# Patient Record
Sex: FEMALE | Race: WHITE
[De-identification: names, ages, dates, MRNs, and addresses within clinical notes are randomized per-mention and may not be internally consistent; named-entity substitution may affect disease eponyms.]

---

## 2024-09-23 ENCOUNTER — APPOINTMENT (OUTPATIENT)
Dept: ORTHOPEDIC SURGERY | Facility: CLINIC | Age: 64
End: 2024-09-23
Payer: COMMERCIAL

## 2024-09-23 VITALS — WEIGHT: 106 LBS | HEIGHT: 60 IN | BODY MASS INDEX: 20.81 KG/M2 | RESPIRATION RATE: 16 BRPM

## 2024-09-23 DIAGNOSIS — Z87.39 PERSONAL HISTORY OF OTHER DISEASES OF THE MUSCULOSKELETAL SYSTEM AND CONNECTIVE TISSUE: ICD-10-CM

## 2024-09-23 DIAGNOSIS — Z78.9 OTHER SPECIFIED HEALTH STATUS: ICD-10-CM

## 2024-09-23 DIAGNOSIS — Z87.891 PERSONAL HISTORY OF NICOTINE DEPENDENCE: ICD-10-CM

## 2024-09-23 DIAGNOSIS — Z82.61 FAMILY HISTORY OF ARTHRITIS: ICD-10-CM

## 2024-09-23 DIAGNOSIS — M79.644 PAIN IN RIGHT FINGER(S): ICD-10-CM

## 2024-09-23 PROBLEM — Z00.00 ENCOUNTER FOR PREVENTIVE HEALTH EXAMINATION: Status: ACTIVE | Noted: 2024-09-23

## 2024-09-23 PROCEDURE — 76882 US LMTD JT/FCL EVL NVASC XTR: CPT

## 2024-09-23 PROCEDURE — 73130 X-RAY EXAM OF HAND: CPT | Mod: RT

## 2024-09-23 PROCEDURE — 20612 ASPIRATE/INJ GANGLION CYST: CPT

## 2024-09-23 PROCEDURE — 99205 OFFICE O/P NEW HI 60 MIN: CPT | Mod: 25

## 2024-09-23 RX ORDER — ESTRADIOL 10 UG/1
10 TABLET, FILM COATED VAGINAL
Refills: 0 | Status: ACTIVE | COMMUNITY

## 2024-09-23 RX ORDER — ROSUVASTATIN CALCIUM 5 MG/1
TABLET, FILM COATED ORAL
Refills: 0 | Status: ACTIVE | COMMUNITY

## 2024-09-23 RX ORDER — MOMETASONE FUROATE MONOHYDRATE 50 UG/1
SPRAY, METERED NASAL
Refills: 0 | Status: ACTIVE | COMMUNITY

## 2024-09-24 NOTE — HISTORY OF PRESENT ILLNESS
[Right] : right hand dominant [FreeTextEntry1] : Patient presents with possible right middle trigger finger.  She is experiencing right middle finger palmar A1 pulley pain.  Her symptoms started about a year ago.  Patient has not medically treated her symptom.  She complains of right, hand discomfort when holding a pen.

## 2024-09-24 NOTE — CONSULT LETTER
[Dear  ___] : Dear  [unfilled], [Consult Letter:] : I had the pleasure of evaluating your patient, [unfilled]. [Please see my note below.] : Please see my note below. [Consult Closing:] : Thank you very much for allowing me to participate in the care of this patient.  If you have any questions, please do not hesitate to contact me. [Sincerely,] : Sincerely, [FreeTextEntry3] : Benjy Blackman MD Co-Director [facility]

## 2024-09-24 NOTE — ASSESSMENT
[FreeTextEntry1] : My impression is that the patient has a right middle finger retinacular cyst. We discussed treatment options and I recommended an aspiration. The risks benefits and alternatives were discussed which included, but were not limited to infection, nerve injury, tendon injury, solid growth or tumor requiring subsequent surgery, recurrence, pain etc. Under informed consent and sterile conditions the mass was aspirated. It was no longer palpable post aspiration. A sterile Band-Aid was placed. Should it recur the patient will contact me.  She also has a trigger digit and the above injection will help treat this.  Should it recur she will call or return

## 2024-09-24 NOTE — PHYSICAL EXAM
[de-identified] : Cystic mass at level of right middle finger proximal digital crease with a positive lift maneuver and triggering. [de-identified] : PA lateral oblique x-rays not demonstrate any acute osseous abnormality.  Ultrasound demonstrates a large hypoechoic mass

## 2024-09-24 NOTE — PHYSICAL EXAM
[de-identified] : Cystic mass at level of right middle finger proximal digital crease with a positive lift maneuver and triggering. [de-identified] : PA lateral oblique x-rays not demonstrate any acute osseous abnormality.  Ultrasound demonstrates a large hypoechoic mass

## 2025-08-04 VITALS — BODY MASS INDEX: 20.81 KG/M2 | RESPIRATION RATE: 16 BRPM | WEIGHT: 106 LBS | HEIGHT: 60 IN

## 2025-08-11 ENCOUNTER — APPOINTMENT (OUTPATIENT)
Dept: ORTHOPEDIC SURGERY | Facility: CLINIC | Age: 65
End: 2025-08-11

## 2025-08-29 VITALS — HEIGHT: 60 IN | RESPIRATION RATE: 16 BRPM | BODY MASS INDEX: 20.81 KG/M2 | WEIGHT: 106 LBS

## 2025-09-04 ENCOUNTER — APPOINTMENT (OUTPATIENT)
Dept: ORTHOPEDIC SURGERY | Facility: CLINIC | Age: 65
End: 2025-09-04